# Patient Record
Sex: MALE | Race: WHITE | NOT HISPANIC OR LATINO
[De-identification: names, ages, dates, MRNs, and addresses within clinical notes are randomized per-mention and may not be internally consistent; named-entity substitution may affect disease eponyms.]

---

## 2017-01-03 ENCOUNTER — APPOINTMENT (OUTPATIENT)
Dept: INTERNAL MEDICINE | Facility: CLINIC | Age: 47
End: 2017-01-03

## 2017-01-23 ENCOUNTER — EMERGENCY (EMERGENCY)
Facility: HOSPITAL | Age: 47
LOS: 1 days | Discharge: HOME | End: 2017-01-23
Attending: EMERGENCY MEDICINE | Admitting: EMERGENCY MEDICINE
Payer: COMMERCIAL

## 2017-01-23 VITALS
OXYGEN SATURATION: 97 % | HEART RATE: 73 BPM | DIASTOLIC BLOOD PRESSURE: 79 MMHG | RESPIRATION RATE: 17 BRPM | TEMPERATURE: 98 F | SYSTOLIC BLOOD PRESSURE: 139 MMHG

## 2017-01-23 VITALS
HEART RATE: 85 BPM | OXYGEN SATURATION: 96 % | SYSTOLIC BLOOD PRESSURE: 145 MMHG | TEMPERATURE: 98 F | RESPIRATION RATE: 18 BRPM | DIASTOLIC BLOOD PRESSURE: 85 MMHG

## 2017-01-23 DIAGNOSIS — K08.499 PARTIAL LOSS OF TEETH DUE TO OTHER SPECIFIED CAUSE, UNSPECIFIED CLASS: Chronic | ICD-10-CM

## 2017-01-23 DIAGNOSIS — R10.30 LOWER ABDOMINAL PAIN, UNSPECIFIED: ICD-10-CM

## 2017-01-23 DIAGNOSIS — Z98.89 OTHER SPECIFIED POSTPROCEDURAL STATES: Chronic | ICD-10-CM

## 2017-01-23 LAB
ALBUMIN SERPL ELPH-MCNC: 4.3 G/DL — SIGNIFICANT CHANGE UP (ref 3.3–5)
ALP SERPL-CCNC: 71 U/L — SIGNIFICANT CHANGE UP (ref 40–120)
ALT FLD-CCNC: 29 U/L RC — SIGNIFICANT CHANGE UP (ref 10–45)
ANION GAP SERPL CALC-SCNC: 14 MMOL/L — SIGNIFICANT CHANGE UP (ref 5–17)
APPEARANCE UR: CLEAR — SIGNIFICANT CHANGE UP
AST SERPL-CCNC: 12 U/L — SIGNIFICANT CHANGE UP (ref 10–40)
BASOPHILS # BLD AUTO: 0 K/UL — SIGNIFICANT CHANGE UP (ref 0–0.2)
BASOPHILS NFR BLD AUTO: 0.5 % — SIGNIFICANT CHANGE UP (ref 0–2)
BILIRUB SERPL-MCNC: 0.1 MG/DL — LOW (ref 0.2–1.2)
BILIRUB UR-MCNC: NEGATIVE — SIGNIFICANT CHANGE UP
BUN SERPL-MCNC: 17 MG/DL — SIGNIFICANT CHANGE UP (ref 7–23)
CALCIUM SERPL-MCNC: 9.5 MG/DL — SIGNIFICANT CHANGE UP (ref 8.4–10.5)
CHLORIDE SERPL-SCNC: 99 MMOL/L — SIGNIFICANT CHANGE UP (ref 96–108)
CO2 SERPL-SCNC: 25 MMOL/L — SIGNIFICANT CHANGE UP (ref 22–31)
COLOR SPEC: YELLOW — SIGNIFICANT CHANGE UP
COMMENT - URINE: SIGNIFICANT CHANGE UP
CREAT SERPL-MCNC: 1.38 MG/DL — HIGH (ref 0.5–1.3)
DIFF PNL FLD: NEGATIVE — SIGNIFICANT CHANGE UP
EOSINOPHIL # BLD AUTO: 0.3 K/UL — SIGNIFICANT CHANGE UP (ref 0–0.5)
EOSINOPHIL NFR BLD AUTO: 3.5 % — SIGNIFICANT CHANGE UP (ref 0–6)
EPI CELLS # UR: SIGNIFICANT CHANGE UP /HPF
GLUCOSE SERPL-MCNC: 98 MG/DL — SIGNIFICANT CHANGE UP (ref 70–99)
GLUCOSE UR QL: NEGATIVE — SIGNIFICANT CHANGE UP
HCT VFR BLD CALC: 41.9 % — SIGNIFICANT CHANGE UP (ref 39–50)
HGB BLD-MCNC: 14.5 G/DL — SIGNIFICANT CHANGE UP (ref 13–17)
HIV 1 & 2 AB SERPL IA.RAPID: SIGNIFICANT CHANGE UP
KETONES UR-MCNC: NEGATIVE — SIGNIFICANT CHANGE UP
LEUKOCYTE ESTERASE UR-ACNC: NEGATIVE — SIGNIFICANT CHANGE UP
LYMPHOCYTES # BLD AUTO: 2 K/UL — SIGNIFICANT CHANGE UP (ref 1–3.3)
LYMPHOCYTES # BLD AUTO: 23.2 % — SIGNIFICANT CHANGE UP (ref 13–44)
MCHC RBC-ENTMCNC: 32.5 PG — SIGNIFICANT CHANGE UP (ref 27–34)
MCHC RBC-ENTMCNC: 34.7 GM/DL — SIGNIFICANT CHANGE UP (ref 32–36)
MCV RBC AUTO: 93.8 FL — SIGNIFICANT CHANGE UP (ref 80–100)
MONOCYTES # BLD AUTO: 0.8 K/UL — SIGNIFICANT CHANGE UP (ref 0–0.9)
MONOCYTES NFR BLD AUTO: 9.3 % — SIGNIFICANT CHANGE UP (ref 2–14)
NEUTROPHILS # BLD AUTO: 5.6 K/UL — SIGNIFICANT CHANGE UP (ref 1.8–7.4)
NEUTROPHILS NFR BLD AUTO: 63.5 % — SIGNIFICANT CHANGE UP (ref 43–77)
NITRITE UR-MCNC: NEGATIVE — SIGNIFICANT CHANGE UP
PH UR: 7 — SIGNIFICANT CHANGE UP (ref 4.8–8)
PLATELET # BLD AUTO: 317 K/UL — SIGNIFICANT CHANGE UP (ref 150–400)
POTASSIUM SERPL-MCNC: 4.3 MMOL/L — SIGNIFICANT CHANGE UP (ref 3.5–5.3)
POTASSIUM SERPL-SCNC: 4.3 MMOL/L — SIGNIFICANT CHANGE UP (ref 3.5–5.3)
PROT SERPL-MCNC: 7.1 G/DL — SIGNIFICANT CHANGE UP (ref 6–8.3)
PROT UR-MCNC: NEGATIVE — SIGNIFICANT CHANGE UP
RBC # BLD: 4.46 M/UL — SIGNIFICANT CHANGE UP (ref 4.2–5.8)
RBC # FLD: 13.1 % — SIGNIFICANT CHANGE UP (ref 10.3–14.5)
RBC CASTS # UR COMP ASSIST: SIGNIFICANT CHANGE UP /HPF (ref 0–2)
SODIUM SERPL-SCNC: 138 MMOL/L — SIGNIFICANT CHANGE UP (ref 135–145)
SP GR SPEC: 1.02 — SIGNIFICANT CHANGE UP (ref 1.01–1.02)
UROBILINOGEN FLD QL: NEGATIVE — SIGNIFICANT CHANGE UP
WBC # BLD: 8.8 K/UL — SIGNIFICANT CHANGE UP (ref 3.8–10.5)
WBC # FLD AUTO: 8.8 K/UL — SIGNIFICANT CHANGE UP (ref 3.8–10.5)

## 2017-01-23 PROCEDURE — 80053 COMPREHEN METABOLIC PANEL: CPT

## 2017-01-23 PROCEDURE — 86703 HIV-1/HIV-2 1 RESULT ANTBDY: CPT

## 2017-01-23 PROCEDURE — 87591 N.GONORRHOEAE DNA AMP PROB: CPT

## 2017-01-23 PROCEDURE — 99284 EMERGENCY DEPT VISIT MOD MDM: CPT | Mod: 25

## 2017-01-23 PROCEDURE — 99284 EMERGENCY DEPT VISIT MOD MDM: CPT

## 2017-01-23 PROCEDURE — 87086 URINE CULTURE/COLONY COUNT: CPT

## 2017-01-23 PROCEDURE — 87491 CHLMYD TRACH DNA AMP PROBE: CPT

## 2017-01-23 PROCEDURE — 81001 URINALYSIS AUTO W/SCOPE: CPT

## 2017-01-23 PROCEDURE — 85027 COMPLETE CBC AUTOMATED: CPT

## 2017-01-23 PROCEDURE — 86780 TREPONEMA PALLIDUM: CPT

## 2017-01-23 RX ORDER — SODIUM CHLORIDE 9 MG/ML
1000 INJECTION INTRAMUSCULAR; INTRAVENOUS; SUBCUTANEOUS ONCE
Qty: 0 | Refills: 0 | Status: COMPLETED | OUTPATIENT
Start: 2017-01-23 | End: 2017-01-23

## 2017-01-23 RX ORDER — CHLORHEXIDINE GLUCONATE 213 G/1000ML
1 SOLUTION TOPICAL
Qty: 1 | Refills: 0 | OUTPATIENT
Start: 2017-01-23 | End: 2017-01-30

## 2017-01-23 RX ORDER — MUPIROCIN 20 MG/G
1 OINTMENT TOPICAL
Qty: 1 | Refills: 0 | OUTPATIENT
Start: 2017-01-23 | End: 2017-01-28

## 2017-01-23 RX ADMIN — Medication 100 MILLIGRAM(S): at 17:33

## 2017-01-23 RX ADMIN — SODIUM CHLORIDE 1000 MILLILITER(S): 9 INJECTION INTRAMUSCULAR; INTRAVENOUS; SUBCUTANEOUS at 19:15

## 2017-01-23 NOTE — ED PROVIDER NOTE - OBJECTIVE STATEMENT
47y/o M PMH anxiety and staph infections c/o infection on his L scrotum x 2.5 weeks. pt states he had a similar occurrence 2 months ago. states his dog scratches them, then he gets a rash, then bump/swelling, and discharge. pt saw his PMD and was given amoxicillin which he completed. states he also noticed a pimple on his R scrotum now x 1 week. c/o rash and pimple on R nostril x 1 week also. denies any fever, chills, SOB, CP, palpitations.

## 2017-01-23 NOTE — ED ADULT NURSE NOTE - OBJECTIVE STATEMENT
c/o pain, irritation, redness and swelling to right groin area. States he shaves his pubic area and develops ingrown hairs and folliculitis frequently. Denies any fever or chills, c/o stuffy nose and feeling tired x 2 months. Patient states his girlfriend also has similar symptoms and she lives in the [place] when he recently visited.

## 2017-01-23 NOTE — ED PROVIDER NOTE - INGUINAL REGION
circumcised. testes x 2. no lesions of penis or scrotum. no swelling of scrotum/testes. no erythema/warmth/LEFT SIDE SWELLING/no tenderness

## 2017-01-23 NOTE — ED PROVIDER NOTE - MEDICAL DECISION MAKING DETAILS
RLQ and R nare cellulitis without obvious fluctuant pocket; likely MRSA given frequency of symptoms and association c breaks in the skin.  Well appearing, VSS, no evidence of more concerning pathology (fornier's, NSTI, etc).  Will d/c on doxy and Rx bactroban/hibiclens and refer to ID.  --BMM

## 2017-01-23 NOTE — ED PROVIDER NOTE - PROGRESS NOTE DETAILS
Attending Note (Juana): Patient signed out to me pending results. Labs reviewed - no neutropneia. mild elevation in creatinine. patient informed. making urine, k wnl.  advised outpatient follow up for this. abx sent.

## 2017-01-23 NOTE — ED PROVIDER NOTE - SKIN WOUND TYPE
+ erythematous macular papaular rash on R cheek. + induration of R suprapubic region (4cm), + ttp. + erythematous macular papaular rash on R cheek. + induration of RLQ along inguinal ligament  (2cm), + ttp. but no crepitus, fluctuance, or d/c

## 2017-01-23 NOTE — ED PROVIDER NOTE - CARE PLAN
Principal Discharge DX:	Abscess  Instructions for follow-up, activity and diet:	1. Take Doxycycline 100mg twice a day with food x 10 days. Apply bactroban ointment inside nostrils twice a day for 1 week. Use hibiclens wash daily for 1 week then as needed. Apply warm compress to affected area(s).  2. Follow up with your PMD within 1 week.  3. Any increased pain, redness, fever, chills return to ED. Principal Discharge DX:	Abscess  Instructions for follow-up, activity and diet:	1. Take Doxycycline 100mg twice a day with food x 10 days. Apply bactroban ointment inside nostrils twice a day for 1 week. Use hibiclens wash daily for 1 week then as needed. Apply warm compress to affected area(s).  2. Follow up with your PMD within 1 week. Your Creatinine was found to be high, discuss this with your PMD, bring a copy of your labs.  3. Any increased pain, redness, fever, chills return to ED. Principal Discharge DX:	Cellulitis of abdominal wall  Goal:	RLQ  Instructions for follow-up, activity and diet:	1. Take Doxycycline 100mg twice a day with food x 10 days. Apply bactroban ointment inside nostrils twice a day for 1 week. Use hibiclens wash daily for 1 week then as needed. Apply warm compress to affected area(s).  2. Follow up with your PMD within 1 week. Your Creatinine was found to be high, discuss this with your PMD, bring a copy of your labs.  3. Any increased pain, redness, fever, chills return to ED.

## 2017-01-23 NOTE — ED ADULT NURSE NOTE - ADDITIONAL PRINTED INSTRUCTIONS GIVEN
vitals stable denies c/o steady gait vitals stable denies c/o steady gait d/c instructions provided by PA

## 2017-01-23 NOTE — ED PROVIDER NOTE - PLAN OF CARE
1. Take Doxycycline 100mg twice a day with food x 10 days. Apply bactroban ointment inside nostrils twice a day for 1 week. Use hibiclens wash daily for 1 week then as needed. Apply warm compress to affected area(s).  2. Follow up with your PMD within 1 week.  3. Any increased pain, redness, fever, chills return to ED. 1. Take Doxycycline 100mg twice a day with food x 10 days. Apply bactroban ointment inside nostrils twice a day for 1 week. Use hibiclens wash daily for 1 week then as needed. Apply warm compress to affected area(s).  2. Follow up with your PMD within 1 week. Your Creatinine was found to be high, discuss this with your PMD, bring a copy of your labs.  3. Any increased pain, redness, fever, chills return to ED. RLQ

## 2017-01-24 LAB
CULTURE RESULTS: NO GROWTH — SIGNIFICANT CHANGE UP
SPECIMEN SOURCE: SIGNIFICANT CHANGE UP
T PALLIDUM AB TITR SER: NEGATIVE — SIGNIFICANT CHANGE UP

## 2017-01-25 LAB
C TRACH RRNA SPEC QL NAA+PROBE: SIGNIFICANT CHANGE UP
C TRACH RRNA SPEC QL NAA+PROBE: SIGNIFICANT CHANGE UP
GC AMPLIFICATION INTERPRETATION: SIGNIFICANT CHANGE UP
N GONORRHOEA RRNA SPEC QL NAA+PROBE: SIGNIFICANT CHANGE UP
SPECIMEN SOURCE: SIGNIFICANT CHANGE UP

## 2017-02-09 ENCOUNTER — APPOINTMENT (OUTPATIENT)
Dept: INTERNAL MEDICINE | Facility: CLINIC | Age: 47
End: 2017-02-09

## 2017-02-14 ENCOUNTER — APPOINTMENT (OUTPATIENT)
Dept: INTERNAL MEDICINE | Facility: CLINIC | Age: 47
End: 2017-02-14

## 2017-02-16 ENCOUNTER — APPOINTMENT (OUTPATIENT)
Dept: INTERNAL MEDICINE | Facility: CLINIC | Age: 47
End: 2017-02-16

## 2017-02-16 VITALS
OXYGEN SATURATION: 96 % | HEART RATE: 78 BPM | DIASTOLIC BLOOD PRESSURE: 80 MMHG | WEIGHT: 210 LBS | HEIGHT: 71 IN | TEMPERATURE: 97.5 F | SYSTOLIC BLOOD PRESSURE: 130 MMHG | RESPIRATION RATE: 12 BRPM | BODY MASS INDEX: 29.4 KG/M2

## 2017-02-16 DIAGNOSIS — L72.9 FOLLICULAR CYST OF THE SKIN AND SUBCUTANEOUS TISSUE, UNSPECIFIED: ICD-10-CM

## 2017-04-25 ENCOUNTER — EMERGENCY (EMERGENCY)
Facility: HOSPITAL | Age: 47
LOS: 1 days | Discharge: HOME | End: 2017-04-25
Attending: EMERGENCY MEDICINE | Admitting: EMERGENCY MEDICINE
Payer: SELF-PAY

## 2017-04-25 VITALS
HEART RATE: 90 BPM | TEMPERATURE: 98 F | DIASTOLIC BLOOD PRESSURE: 86 MMHG | OXYGEN SATURATION: 96 % | RESPIRATION RATE: 17 BRPM | SYSTOLIC BLOOD PRESSURE: 125 MMHG

## 2017-04-25 VITALS
TEMPERATURE: 98 F | RESPIRATION RATE: 18 BRPM | DIASTOLIC BLOOD PRESSURE: 84 MMHG | SYSTOLIC BLOOD PRESSURE: 124 MMHG | HEART RATE: 76 BPM | OXYGEN SATURATION: 97 %

## 2017-04-25 DIAGNOSIS — K08.499 PARTIAL LOSS OF TEETH DUE TO OTHER SPECIFIED CAUSE, UNSPECIFIED CLASS: Chronic | ICD-10-CM

## 2017-04-25 DIAGNOSIS — Z98.89 OTHER SPECIFIED POSTPROCEDURAL STATES: Chronic | ICD-10-CM

## 2017-04-25 DIAGNOSIS — L03.211 CELLULITIS OF FACE: ICD-10-CM

## 2017-04-25 DIAGNOSIS — L53.9 ERYTHEMATOUS CONDITION, UNSPECIFIED: ICD-10-CM

## 2017-04-25 PROCEDURE — 99283 EMERGENCY DEPT VISIT LOW MDM: CPT

## 2017-04-25 RX ORDER — AZTREONAM 2 G
1 VIAL (EA) INJECTION
Qty: 14 | Refills: 0 | OUTPATIENT
Start: 2017-04-25 | End: 2017-05-02

## 2017-04-25 RX ADMIN — Medication 1 TABLET(S): at 21:29

## 2017-04-25 NOTE — ED ADULT NURSE NOTE - OBJECTIVE STATEMENT
Pt is a 47 yo male with the redness and swelling that started in Sunday and has been getting increasingly worse over the past few days. Pt states that he feels Pt is a 47 yo male with the redness and swelling that started in Sunday and has been getting increasingly worse over the past few days. Pt states that he feels that he may have been bit by a spider while he was sleeping, he found/killed two spiders in his room when he woke up. Pt has a pmh of MRSA, Anxiety and depression. He denies any CP or SOB no N/V/D no cough fever or chills.

## 2017-04-25 NOTE — ED ADULT NURSE NOTE - CHIEF COMPLAINT QUOTE
Patient c/o of redness and swelling on the tip of his nose, "possible  a bug bite", history of MRSA in the past .

## 2017-04-25 NOTE — ED PROVIDER NOTE - OBJECTIVE STATEMENT
47yo male PMH anxiety, depression, MRSA infection in past presenting with nose redness and swelling since Sunday, took amoxicillin for 2 days but feels pain is getting worse, now feels it in his left cheek and feels that left eye is becoming swollen. No fevers or chills, no chest pain or shortness of breath, no difficulty swallowing.

## 2017-04-25 NOTE — ED PROVIDER NOTE - ATTENDING CONTRIBUTION TO CARE
Attending MD Bergman: I personally have seen and examined this patient.  Resident note reviewed and agree on plan of care and except where noted.  See below for details.    46M with PMH anxiety, depression and previous MRSA infections presents to the ED with nose redness and pain since Sunday.  Reports that he uses nasal hair cutters twice weekly.  Reports he took Amoxicillin that someone gave him (not MD) for two days with symptoms continuing to worsen.  Reports that now feels pain is extending to L cheek and up towards L eye.  Denies fevers, chills, diplopia, blurry vision, pain with EOMI, epistaxis, sore throat.  On exam, NAD, NCAT, PERRL, EOMI, +erythema and tenderness to tip of nose, no nasal septal hematoma, +erythema to distal septal mucosal to L nares, no fluctuance, no dried blood in nares; Plan: ENT, suspect cellulitis r/o abscess

## 2017-04-25 NOTE — ED PROVIDER NOTE - MEDICAL DECISION MAKING DETAILS
45yo male PMH anxiety, multiple abscesses in past, presenting with nose erythema, likely cellulitis. ENT consulted, pt will likely get antibiotics, DC home vs CDU. Latonya Falk DO

## 2017-04-25 NOTE — ED PROVIDER NOTE - PROGRESS NOTE DETAILS
Attending MD Bergman: Seen by ENT, agree cellulitis.  Rx Bactrim DS BID x 1 week.  Patient stable for discharge. Follow up instructions given, importance of follow up emphasized, return to ED parameters reviewed and patient verbalized understanding.  All questions answered, all concerns addressed.

## 2017-04-25 NOTE — ED ADULT TRIAGE NOTE - CHIEF COMPLAINT QUOTE
Patient c/o of redness and swelling on the tip of his nose, "possible  a bug bite", history of MRSA in the past. Patient c/o of redness and swelling on the tip of his nose, "possible  a bug bite", history of MRSA in the past .

## 2017-04-25 NOTE — ED PROVIDER NOTE - PHYSICAL EXAMINATION
HEENT: Tip of nose erythematous, with erythema on nasal septum, no abscess. PERRL, EOMI without pain or entrapment

## 2017-05-01 ENCOUNTER — EMERGENCY (EMERGENCY)
Facility: HOSPITAL | Age: 47
LOS: 1 days | Discharge: HOME | End: 2017-05-01
Attending: EMERGENCY MEDICINE | Admitting: EMERGENCY MEDICINE
Payer: SELF-PAY

## 2017-05-01 VITALS
OXYGEN SATURATION: 96 % | RESPIRATION RATE: 85 BRPM | SYSTOLIC BLOOD PRESSURE: 132 MMHG | HEART RATE: 86 BPM | TEMPERATURE: 98 F | DIASTOLIC BLOOD PRESSURE: 82 MMHG

## 2017-05-01 DIAGNOSIS — L03.211 CELLULITIS OF FACE: ICD-10-CM

## 2017-05-01 DIAGNOSIS — Z98.89 OTHER SPECIFIED POSTPROCEDURAL STATES: Chronic | ICD-10-CM

## 2017-05-01 DIAGNOSIS — K08.499 PARTIAL LOSS OF TEETH DUE TO OTHER SPECIFIED CAUSE, UNSPECIFIED CLASS: Chronic | ICD-10-CM

## 2017-05-01 PROCEDURE — 99283 EMERGENCY DEPT VISIT LOW MDM: CPT

## 2017-05-01 NOTE — ED PROVIDER NOTE - OBJECTIVE STATEMENT
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 Patient was here on April 25th for nose, left cheek and L upper lip swelling, given Bactrim x7 days, cellulitis improved dramatically but he continues to have drainage of purulent material and swelling of his nose. No F/C.

## 2017-05-01 NOTE — ED PROVIDER NOTE - CARE PLAN
Principal Discharge DX:	Cellulitis of nose  Instructions for follow-up, activity and diet:	You were seen in the ER for nose cellulitis. You must follow up with ENT in 24 to 48 hours. The number for the otolaryngology (ear, nose, throat) clinic is [phone]). You must follow up with your primary physician in 24 to 48 hours. Return to the ER for any new or worsening signs/symptoms.   1) Take clindamycin as prescribed Principal Discharge DX:	Cellulitis of nose  Instructions for follow-up, activity and diet:	You were seen in the ER for nose cellulitis. You must follow up with ENT in 24 to 48 hours. The number for the otolaryngology (ear, nose, throat) clinic is [phone]). You must follow up with your primary physician in 24 to 48 hours. Return to the ER for any new or worsening signs/symptoms.   1) Take clindamycin as prescribed Principal Discharge DX:	Cellulitis of nose  Instructions for follow-up, activity and diet:	You were seen in the ER for nose cellulitis. You must follow up with ENT in 24 to 48 hours. The number for the otolaryngology (ear, nose, throat) clinic is [phone]). You must follow up with your primary physician in 24 to 48 hours. Return to the ER for any new or worsening signs/symptoms.   1) Take clindamycin as prescribed

## 2017-05-01 NOTE — ED PROVIDER NOTE - NS ED ROS FT
No fever or chills, no change in vision, no trouble swallowing or speaking, no chest pain, no cough or SOB, no abdominal pain, no nausea or no vomiting, no joint pain, no rashes, no headache,  otherwise as HPI or negative

## 2017-05-01 NOTE — ED PROVIDER NOTE - ATTENDING CONTRIBUTION TO CARE
46yoM returns with improved facial cellulitis but persistent swelling, redness, and intermittent nasal drainage.   On exam, L nare with swelling, redness ?fluctuance.  CEllulitis with possible nasal abscess - pt wishes to fu with ENT outpt.   Will incise at bedside, change abx to doxy, fu ENT.

## 2017-05-01 NOTE — ED PROVIDER NOTE - PLAN OF CARE
You were seen in the ER for nose cellulitis. You must follow up with ENT in 24 to 48 hours. The number for the otolaryngology (ear, nose, throat) clinic is [phone]). You must follow up with your primary physician in 24 to 48 hours. Return to the ER for any new or worsening signs/symptoms.   1) Take clindamycin as prescribed

## 2017-05-01 NOTE — ED PROVIDER NOTE - PHYSICAL EXAMINATION
GENERAL: Awake, alert in NAD // HEENT: MMM // HEAD: Symmetric and atraumatic // EYES: EOMI // CARDIO: Skin warm and well perfused // PULM: normal respiratory effort // NEURO: Motor, sensory grossly intact // SKIN: Warm, dry, good turgur, redness, swelling of nose with small (2mm) ulceration draining purulent material from L nare.  // PSYCH: Mood and affect appropriate.

## 2017-05-01 NOTE — ED PROVIDER NOTE - MEDICAL DECISION MAKING DETAILS
Patient with cellulitis of nose, doubt abscess given not fluctuant. Patient did not follow up with ENT as recommended at last visit. Will change antibiotics to clindamycin and discharge home with close ENT follow up. Patient with cellulitis of nose, doubt abscess given not fluctuant. Patient did not follow up with ENT as recommended at last visit. Will change antibiotics to clindamycin and discharge home with close ENT follow up.  Humberto Lin DO; see attending attestation

## 2017-05-01 NOTE — ED ADULT NURSE NOTE - OBJECTIVE STATEMENT
pt states, "was seen in ED on 4/25/17 for left sided facial swelling and was d/c with antibiotics and told to follow up with ENT. I have completed the course of abx and the swelling on the left side of my nose has not completely gone away." pt denies difficulty breathing/SOB, fevers, chills.

## 2017-05-08 ENCOUNTER — APPOINTMENT (OUTPATIENT)
Dept: PAIN MANAGEMENT | Facility: CLINIC | Age: 47
End: 2017-05-08

## 2017-05-08 VITALS
HEIGHT: 71 IN | WEIGHT: 213 LBS | BODY MASS INDEX: 29.82 KG/M2 | DIASTOLIC BLOOD PRESSURE: 85 MMHG | SYSTOLIC BLOOD PRESSURE: 120 MMHG | HEART RATE: 83 BPM

## 2017-05-08 RX ORDER — QUETIAPINE 100 MG/1
100 TABLET, FILM COATED ORAL DAILY
Refills: 0 | Status: ACTIVE | COMMUNITY

## 2017-05-08 RX ORDER — DIVALPROEX SODIUM 500 MG/1
500 TABLET, DELAYED RELEASE ORAL
Refills: 0 | Status: ACTIVE | COMMUNITY

## 2017-06-08 ENCOUNTER — MEDICATION RENEWAL (OUTPATIENT)
Age: 47
End: 2017-06-08

## 2017-06-08 ENCOUNTER — MESSAGE (OUTPATIENT)
Age: 47
End: 2017-06-08

## 2017-06-14 ENCOUNTER — APPOINTMENT (OUTPATIENT)
Dept: PAIN MANAGEMENT | Facility: CLINIC | Age: 47
End: 2017-06-14

## 2017-06-19 ENCOUNTER — APPOINTMENT (OUTPATIENT)
Dept: PAIN MANAGEMENT | Facility: CLINIC | Age: 47
End: 2017-06-19

## 2017-06-19 ENCOUNTER — EMERGENCY (EMERGENCY)
Facility: HOSPITAL | Age: 47
LOS: 1 days | Discharge: HOME | End: 2017-06-19
Attending: EMERGENCY MEDICINE | Admitting: EMERGENCY MEDICINE
Payer: COMMERCIAL

## 2017-06-19 VITALS
OXYGEN SATURATION: 97 % | RESPIRATION RATE: 16 BRPM | HEART RATE: 81 BPM | SYSTOLIC BLOOD PRESSURE: 143 MMHG | TEMPERATURE: 98 F | DIASTOLIC BLOOD PRESSURE: 88 MMHG

## 2017-06-19 DIAGNOSIS — Z98.89 OTHER SPECIFIED POSTPROCEDURAL STATES: Chronic | ICD-10-CM

## 2017-06-19 DIAGNOSIS — K08.499 PARTIAL LOSS OF TEETH DUE TO OTHER SPECIFIED CAUSE, UNSPECIFIED CLASS: Chronic | ICD-10-CM

## 2017-06-19 PROCEDURE — 99283 EMERGENCY DEPT VISIT LOW MDM: CPT

## 2017-06-19 RX ORDER — MUPIROCIN 20 MG/G
1 OINTMENT TOPICAL
Qty: 1 | Refills: 0 | OUTPATIENT
Start: 2017-06-19 | End: 2017-06-26

## 2017-06-19 RX ADMIN — Medication 100 MILLIGRAM(S): at 23:59

## 2017-06-19 NOTE — ED PROVIDER NOTE - PLAN OF CARE
1) Please follow-up with your Primary Medical Doctor in 3-5 days. If you need to find a new physician, please call [phone].  2) Return to the Emergency Department if you experiences: fevers, chills, vomiting, headache, worsening pain, or symptoms that are new or recurrent.  3) If you have any questions or concerns, do not hesitate to contact us at [phone].  4) Take the antibiotics as prescribed.  5) It is very important to follow-up with the ENT, call them at [phone].

## 2017-06-19 NOTE — ED PROVIDER NOTE - MEDICAL DECISION MAKING DETAILS
abscess of L nostril and L ear, w/o signs of mastoiditis (no mastoid erythema or TTP), and no signs of sepsis. Pt w/ stable vitals, well-appearing. Will trial PO atbx as outpatient. L nostril abscess is draining so no indication for I&D. abscess of L nostril and L ear, w/o signs of mastoiditis (no mastoid erythema or TTP), and no signs of sepsis. Pt w/ stable vitals, well-appearing. Will trial PO atbx as outpatient. L nostril abscess is draining so no indication for I&D.  Humberto Lin DO; see attending attestation

## 2017-06-19 NOTE — ED ADULT NURSE NOTE - OBJECTIVE STATEMENT
46yr male presented to ED c/o cysts in R & L nostrils.  Pt was in ED 2 weeks prior for L side cyst in nostril, MD lanced cyst was treated with antibiotics and sent home with medication and told to follow up with ENT. Pt reports he did not and now cyst has returned on L side of nostril, reports pain to L jaw and ear, and has cyst on R side of nostril as well, Pt reports some pain to L ear and jaw, Pt has not taken anything for the pain, skin warm dry & intact, a&ox4.

## 2017-06-19 NOTE — ED PROVIDER NOTE - CARE PLAN
Principal Discharge DX:	Abscess of face  Instructions for follow-up, activity and diet:	1) Please follow-up with your Primary Medical Doctor in 3-5 days. If you need to find a new physician, please call [phone].  2) Return to the Emergency Department if you experiences: fevers, chills, vomiting, headache, worsening pain, or symptoms that are new or recurrent.  3) If you have any questions or concerns, do not hesitate to contact us at [phone].  4) Take the antibiotics as prescribed.  5) It is very important to follow-up with the ENT, call them at [phone]. Principal Discharge DX:	Abscess of face  Instructions for follow-up, activity and diet:	1) Please follow-up with your Primary Medical Doctor in 3-5 days. If you need to find a new physician, please call [phone].  2) Return to the Emergency Department if you experiences: fevers, chills, vomiting, headache, worsening pain, or symptoms that are new or recurrent.  3) If you have any questions or concerns, do not hesitate to contact us at [phone].  4) Take the antibiotics as prescribed.  5) It is very important to follow-up with the ENT, call them at [phone].

## 2017-06-19 NOTE — ED ADULT TRIAGE NOTE - CHIEF COMPLAINT QUOTE
sore throat, left sided ear pain and cyst in  right nostril x 2 weeks   facial operation 20 months ago (from dental infection) with +MRSA and infection has been recurrent since then

## 2017-06-19 NOTE — ED PROVIDER NOTE - PHYSICAL EXAMINATION
Physical Exam: NAD, AAOx3, NCAT, MMM, L external auditory canal w/ erythema proximal to the TM w/ clear TM bilaterally, L nostril w/ abscess w/ yellow drainage, neck is supple, PERRL, CTAB, normal rate and regular rhythm, abdomen is soft and NTND, No edema, No deformity of extremities, No rashes, CN grossly intact, No focal motor or sensory deficits. ~ Juni Ayala MD

## 2017-06-19 NOTE — ED PROVIDER NOTE - ATTENDING CONTRIBUTION TO CARE
pt known to me presents with recurrent nostril abscess. did not follow up with ENT as instructed previously. Says he will this time.   On exam, mild deuce L nostril with mild erythema.   A: nostril abscess.   P: will rx abx again to cover MRSA. pt states will fu ENT this time.

## 2017-06-19 NOTE — ED PROVIDER NOTE - NS ED ROS FT
+ resolved fever, chills, no change in vision, no change in hearing, no chest pain, no shortness of breath, no abdominal pain, no vomiting, no dysuria, no muscle pain, + abscesses, no loss of consciousness. ~ Juni Ayala MD

## 2017-06-19 NOTE — ED ADULT NURSE NOTE - FAMILY HISTORY
Mother  Still living? Unknown  Family history of lung cancer, Age at diagnosis: Age Unknown     Father  Still living? Unknown  Family history of diabetes mellitus, Age at diagnosis: Age Unknown

## 2017-06-19 NOTE — ED PROVIDER NOTE - OBJECTIVE STATEMENT
Presents with L nostril cyst and L ear pain.   Hx of recurrent MRSA abscesses, has been on Bactrim and Doxy PO. Also took Amoxicillin w/ continued symptoms.

## 2017-06-20 VITALS
RESPIRATION RATE: 17 BRPM | SYSTOLIC BLOOD PRESSURE: 140 MMHG | HEART RATE: 72 BPM | DIASTOLIC BLOOD PRESSURE: 87 MMHG | OXYGEN SATURATION: 96 %

## 2017-06-26 ENCOUNTER — APPOINTMENT (OUTPATIENT)
Dept: PAIN MANAGEMENT | Facility: CLINIC | Age: 47
End: 2017-06-26

## 2017-06-26 VITALS
WEIGHT: 224 LBS | SYSTOLIC BLOOD PRESSURE: 118 MMHG | BODY MASS INDEX: 31.36 KG/M2 | DIASTOLIC BLOOD PRESSURE: 78 MMHG | HEIGHT: 71 IN

## 2017-06-26 RX ORDER — BACLOFEN 10 MG/1
10 TABLET ORAL
Qty: 60 | Refills: 1 | Status: DISCONTINUED | COMMUNITY
Start: 2017-05-08 | End: 2017-06-26

## 2017-07-11 ENCOUNTER — APPOINTMENT (OUTPATIENT)
Dept: INTERNAL MEDICINE | Facility: CLINIC | Age: 47
End: 2017-07-11

## 2017-07-11 ENCOUNTER — MEDICATION RENEWAL (OUTPATIENT)
Age: 47
End: 2017-07-11

## 2017-08-01 ENCOUNTER — APPOINTMENT (OUTPATIENT)
Dept: PAIN MANAGEMENT | Facility: CLINIC | Age: 47
End: 2017-08-01

## 2017-08-23 ENCOUNTER — MEDICATION RENEWAL (OUTPATIENT)
Age: 47
End: 2017-08-23

## 2017-08-30 ENCOUNTER — APPOINTMENT (OUTPATIENT)
Dept: PAIN MANAGEMENT | Facility: CLINIC | Age: 47
End: 2017-08-30

## 2017-10-06 ENCOUNTER — APPOINTMENT (OUTPATIENT)
Dept: PAIN MANAGEMENT | Facility: CLINIC | Age: 47
End: 2017-10-06
Payer: COMMERCIAL

## 2017-10-06 VITALS
WEIGHT: 227 LBS | DIASTOLIC BLOOD PRESSURE: 86 MMHG | BODY MASS INDEX: 31.78 KG/M2 | SYSTOLIC BLOOD PRESSURE: 129 MMHG | HEART RATE: 75 BPM | HEIGHT: 71 IN

## 2017-10-06 DIAGNOSIS — R69 ILLNESS, UNSPECIFIED: ICD-10-CM

## 2017-10-06 PROCEDURE — 99213 OFFICE O/P EST LOW 20 MIN: CPT

## 2017-10-06 RX ORDER — SULFAMETHOXAZOLE AND TRIMETHOPRIM 800; 160 MG/1; MG/1
800-160 TABLET ORAL
Qty: 14 | Refills: 0 | Status: DISCONTINUED | COMMUNITY
Start: 2017-04-26

## 2017-10-06 RX ORDER — DIVALPROEX SODIUM 250 MG/1
250 TABLET, DELAYED RELEASE ORAL
Qty: 60 | Refills: 0 | Status: DISCONTINUED | COMMUNITY
Start: 2017-03-30

## 2017-10-06 RX ORDER — CYCLOBENZAPRINE HYDROCHLORIDE 10 MG/1
10 TABLET, FILM COATED ORAL 3 TIMES DAILY
Qty: 90 | Refills: 1 | Status: DISCONTINUED | COMMUNITY
Start: 2017-06-26 | End: 2017-10-06

## 2017-10-06 RX ORDER — DOXYCYCLINE HYCLATE 100 MG/1
100 CAPSULE ORAL
Qty: 14 | Refills: 0 | Status: DISCONTINUED | COMMUNITY
Start: 2017-05-02

## 2017-10-06 RX ORDER — MUPIROCIN 20 MG/G
2 OINTMENT TOPICAL
Qty: 22 | Refills: 0 | Status: DISCONTINUED | COMMUNITY
Start: 2017-07-10

## 2017-10-06 RX ORDER — DOXYCYCLINE HYCLATE 100 MG/1
100 TABLET, DELAYED RELEASE ORAL
Qty: 20 | Refills: 0 | Status: DISCONTINUED | COMMUNITY
Start: 2017-06-20

## 2017-10-06 RX ORDER — GABAPENTIN 100 MG/1
100 CAPSULE ORAL 3 TIMES DAILY
Qty: 90 | Refills: 1 | Status: DISCONTINUED | COMMUNITY
Start: 2017-07-11 | End: 2017-10-06

## 2017-11-14 ENCOUNTER — APPOINTMENT (OUTPATIENT)
Dept: INTERNAL MEDICINE | Facility: CLINIC | Age: 47
End: 2017-11-14

## 2018-01-12 ENCOUNTER — APPOINTMENT (OUTPATIENT)
Dept: PAIN MANAGEMENT | Facility: CLINIC | Age: 48
End: 2018-01-12
Payer: COMMERCIAL

## 2018-01-12 VITALS
HEART RATE: 75 BPM | WEIGHT: 229 LBS | HEIGHT: 71 IN | DIASTOLIC BLOOD PRESSURE: 79 MMHG | SYSTOLIC BLOOD PRESSURE: 128 MMHG | BODY MASS INDEX: 32.06 KG/M2

## 2018-01-12 PROCEDURE — 99213 OFFICE O/P EST LOW 20 MIN: CPT

## 2018-02-06 ENCOUNTER — MEDICATION RENEWAL (OUTPATIENT)
Age: 48
End: 2018-02-06

## 2018-02-08 ENCOUNTER — MEDICATION RENEWAL (OUTPATIENT)
Age: 48
End: 2018-02-08

## 2018-02-12 ENCOUNTER — RX RENEWAL (OUTPATIENT)
Age: 48
End: 2018-02-12

## 2018-03-12 ENCOUNTER — RX RENEWAL (OUTPATIENT)
Age: 48
End: 2018-03-12

## 2018-03-22 ENCOUNTER — APPOINTMENT (OUTPATIENT)
Dept: INTERNAL MEDICINE | Facility: CLINIC | Age: 48
End: 2018-03-22
Payer: COMMERCIAL

## 2018-03-22 VITALS
WEIGHT: 226 LBS | HEIGHT: 71 IN | DIASTOLIC BLOOD PRESSURE: 83 MMHG | SYSTOLIC BLOOD PRESSURE: 136 MMHG | TEMPERATURE: 98.3 F | HEART RATE: 82 BPM | RESPIRATION RATE: 12 BRPM | OXYGEN SATURATION: 95 % | BODY MASS INDEX: 31.64 KG/M2

## 2018-03-22 DIAGNOSIS — L02.01 CUTANEOUS ABSCESS OF FACE: ICD-10-CM

## 2018-03-22 DIAGNOSIS — L03.211 CELLULITIS OF FACE: ICD-10-CM

## 2018-03-22 DIAGNOSIS — Z00.00 ENCOUNTER FOR GENERAL ADULT MEDICAL EXAMINATION W/OUT ABNORMAL FINDINGS: ICD-10-CM

## 2018-03-22 PROCEDURE — 99214 OFFICE O/P EST MOD 30 MIN: CPT

## 2018-03-22 RX ORDER — AMOXICILLIN AND CLAVULANATE POTASSIUM 875; 125 MG/1; MG/1
875-125 TABLET, COATED ORAL
Qty: 14 | Refills: 0 | Status: ACTIVE | COMMUNITY
Start: 2018-03-22 | End: 1900-01-01

## 2018-03-23 DIAGNOSIS — E78.5 HYPERLIPIDEMIA, UNSPECIFIED: ICD-10-CM

## 2018-03-23 LAB
25(OH)D3 SERPL-MCNC: 11.5 NG/ML
ALBUMIN SERPL ELPH-MCNC: 4.7 G/DL
ALP BLD-CCNC: 59 U/L
ALT SERPL-CCNC: 41 U/L
ANION GAP SERPL CALC-SCNC: 15 MMOL/L
AST SERPL-CCNC: 36 U/L
BASOPHILS # BLD AUTO: 0.03 K/UL
BASOPHILS NFR BLD AUTO: 0.4 %
BILIRUB SERPL-MCNC: 0.2 MG/DL
BUN SERPL-MCNC: 16 MG/DL
CALCIUM SERPL-MCNC: 9.6 MG/DL
CHLORIDE SERPL-SCNC: 100 MMOL/L
CHOLEST SERPL-MCNC: 261 MG/DL
CHOLEST/HDLC SERPL: 8.4 RATIO
CO2 SERPL-SCNC: 23 MMOL/L
CREAT SERPL-MCNC: 0.96 MG/DL
EOSINOPHIL # BLD AUTO: 0.27 K/UL
EOSINOPHIL NFR BLD AUTO: 3.9 %
GLUCOSE SERPL-MCNC: 100 MG/DL
HCT VFR BLD CALC: 42.1 %
HDLC SERPL-MCNC: 31 MG/DL
HGB BLD-MCNC: 14.9 G/DL
IMM GRANULOCYTES NFR BLD AUTO: 0.1 %
LDLC SERPL CALC-MCNC: NORMAL
LYMPHOCYTES # BLD AUTO: 1.63 K/UL
LYMPHOCYTES NFR BLD AUTO: 23.4 %
MAN DIFF?: NORMAL
MCHC RBC-ENTMCNC: 32.2 PG
MCHC RBC-ENTMCNC: 35.4 GM/DL
MCV RBC AUTO: 90.9 FL
MONOCYTES # BLD AUTO: 0.71 K/UL
MONOCYTES NFR BLD AUTO: 10.2 %
NEUTROPHILS # BLD AUTO: 4.33 K/UL
NEUTROPHILS NFR BLD AUTO: 62 %
PLATELET # BLD AUTO: 267 K/UL
POTASSIUM SERPL-SCNC: 4 MMOL/L
PROT SERPL-MCNC: 7.8 G/DL
RBC # BLD: 4.63 M/UL
RBC # FLD: 13.9 %
SODIUM SERPL-SCNC: 138 MMOL/L
TRIGL SERPL-MCNC: 842 MG/DL
TSH SERPL-ACNC: 1.56 UIU/ML
WBC # FLD AUTO: 6.98 K/UL

## 2018-04-02 RX ORDER — OMEGA-3-ACID ETHYL ESTERS CAPSULES 1 G/1
1 CAPSULE, LIQUID FILLED ORAL TWICE DAILY
Qty: 120 | Refills: 0 | Status: ACTIVE | COMMUNITY
Start: 2018-03-23 | End: 1900-01-01

## 2018-04-30 ENCOUNTER — RX RENEWAL (OUTPATIENT)
Age: 48
End: 2018-04-30

## 2018-05-04 ENCOUNTER — APPOINTMENT (OUTPATIENT)
Dept: PAIN MANAGEMENT | Facility: CLINIC | Age: 48
End: 2018-05-04

## 2018-05-11 ENCOUNTER — APPOINTMENT (OUTPATIENT)
Dept: PAIN MANAGEMENT | Facility: CLINIC | Age: 48
End: 2018-05-11
Payer: COMMERCIAL

## 2018-05-11 VITALS
HEIGHT: 71 IN | WEIGHT: 226 LBS | DIASTOLIC BLOOD PRESSURE: 88 MMHG | HEART RATE: 86 BPM | BODY MASS INDEX: 31.64 KG/M2 | SYSTOLIC BLOOD PRESSURE: 145 MMHG

## 2018-05-11 DIAGNOSIS — R51 HEADACHE: ICD-10-CM

## 2018-05-11 PROCEDURE — 99213 OFFICE O/P EST LOW 20 MIN: CPT

## 2018-05-18 ENCOUNTER — MEDICATION RENEWAL (OUTPATIENT)
Age: 48
End: 2018-05-18

## 2018-05-18 ENCOUNTER — APPOINTMENT (OUTPATIENT)
Dept: PAIN MANAGEMENT | Facility: CLINIC | Age: 48
End: 2018-05-18

## 2018-05-18 RX ORDER — CARBAMAZEPINE 100 MG/1
100 TABLET, CHEWABLE ORAL
Qty: 270 | Refills: 2 | Status: ACTIVE | COMMUNITY
Start: 2018-02-06 | End: 1900-01-01

## 2018-05-22 ENCOUNTER — RX RENEWAL (OUTPATIENT)
Age: 48
End: 2018-05-22

## 2018-05-29 ENCOUNTER — APPOINTMENT (OUTPATIENT)
Dept: INTERNAL MEDICINE | Facility: CLINIC | Age: 48
End: 2018-05-29
Payer: COMMERCIAL

## 2018-05-29 VITALS
BODY MASS INDEX: 31.5 KG/M2 | DIASTOLIC BLOOD PRESSURE: 93 MMHG | HEIGHT: 71 IN | HEART RATE: 72 BPM | RESPIRATION RATE: 12 BRPM | SYSTOLIC BLOOD PRESSURE: 143 MMHG | TEMPERATURE: 98.7 F | WEIGHT: 225 LBS | OXYGEN SATURATION: 95 %

## 2018-05-29 DIAGNOSIS — H00.015 HORDEOLUM EXTERNUM LEFT LOWER EYELID: ICD-10-CM

## 2018-05-29 PROCEDURE — 99213 OFFICE O/P EST LOW 20 MIN: CPT

## 2018-05-29 RX ORDER — ERYTHROMYCIN 5 MG/G
5 OINTMENT OPHTHALMIC 4 TIMES DAILY
Qty: 1 | Refills: 0 | Status: ACTIVE | COMMUNITY
Start: 2018-05-29 | End: 1900-01-01

## 2018-05-29 NOTE — PHYSICAL EXAM
[Conjunctiva] : the conjunctiva were normal in both eyes [Blepharitis Both Eyes] : no blepharitis of the lower eyelids [___] : a [unfilled] ~Umm stye was noted on the lower eyelid [Eyelid Swelling Left Lower Lid] : swelling of the lower eyelid was noted  [Lacrimal Gland Purulent Discharge Right Eye] : without purulent discharge [Lacrimal Gland Purulent Discharge Left Eye] : without purulent discharge [PERRL] : pupils were equal in size, round, and reactive to light [EOM Intact] : extraocular movements were intact [Normal] : the fundoscopic exam was normal in both eyes

## 2018-05-29 NOTE — ASSESSMENT
[FreeTextEntry1] : ACUTE VISIT OF 47 Y OLD MALE WHO PRESENTS WITH LEFT LOWER EYELID HORDEOLUM = ERYTHROMYCIN OINT RX,REFER TO SEE OPHTHALMOLOGY;CONTINUE WARM COMPRESSES

## 2018-06-25 ENCOUNTER — RX RENEWAL (OUTPATIENT)
Age: 48
End: 2018-06-25

## 2018-06-29 ENCOUNTER — RX RENEWAL (OUTPATIENT)
Age: 48
End: 2018-06-29

## 2018-06-29 RX ORDER — CHOLECALCIFEROL (VITAMIN D3) 1250 MCG
1.25 MG CAPSULE ORAL
Qty: 6 | Refills: 1 | Status: ACTIVE | COMMUNITY
Start: 2018-03-23 | End: 1900-01-01

## 2018-08-09 ENCOUNTER — RX RENEWAL (OUTPATIENT)
Age: 48
End: 2018-08-09

## 2018-08-16 ENCOUNTER — APPOINTMENT (OUTPATIENT)
Dept: INTERNAL MEDICINE | Facility: CLINIC | Age: 48
End: 2018-08-16
Payer: COMMERCIAL

## 2018-08-16 VITALS
DIASTOLIC BLOOD PRESSURE: 82 MMHG | WEIGHT: 233 LBS | HEART RATE: 83 BPM | TEMPERATURE: 98.4 F | SYSTOLIC BLOOD PRESSURE: 133 MMHG | RESPIRATION RATE: 12 BRPM | HEIGHT: 71 IN | BODY MASS INDEX: 32.62 KG/M2 | OXYGEN SATURATION: 95 %

## 2018-08-16 DIAGNOSIS — Z87.09 PERSONAL HISTORY OF OTHER DISEASES OF THE RESPIRATORY SYSTEM: ICD-10-CM

## 2018-08-16 PROCEDURE — 99214 OFFICE O/P EST MOD 30 MIN: CPT

## 2018-08-16 PROCEDURE — 87880 STREP A ASSAY W/OPTIC: CPT | Mod: QW

## 2018-08-16 RX ORDER — MOMETASONE 50 UG/1
50 SPRAY, METERED NASAL DAILY
Qty: 1 | Refills: 0 | Status: ACTIVE | COMMUNITY
Start: 2018-08-16 | End: 1900-01-01

## 2018-08-16 RX ORDER — AMOXICILLIN AND CLAVULANATE POTASSIUM 875; 125 MG/1; MG/1
875-125 TABLET, COATED ORAL TWICE DAILY
Qty: 14 | Refills: 0 | Status: ACTIVE | COMMUNITY
Start: 2018-08-16 | End: 1900-01-01

## 2018-08-17 NOTE — PHYSICAL EXAM
[No Acute Distress] : no acute distress [Well Nourished] : well nourished [Well Developed] : well developed [Normal Sclera/Conjunctiva] : normal sclera/conjunctiva [EOMI] : extraocular movements intact [Normal Outer Ear/Nose] : the outer ears and nose were normal in appearance [Normal TMs] : both tympanic membranes were normal [No JVD] : no jugular venous distention [Supple] : supple [No Lymphadenopathy] : no lymphadenopathy [Thyroid Normal, No Nodules] : the thyroid was normal and there were no nodules present [No Respiratory Distress] : no respiratory distress  [Clear to Auscultation] : lungs were clear to auscultation bilaterally [No Accessory Muscle Use] : no accessory muscle use [Normal Rate] : normal rate  [Regular Rhythm] : with a regular rhythm [Normal S1, S2] : normal S1 and S2 [No Murmur] : no murmur heard [No Carotid Bruits] : no carotid bruits [Pedal Pulses Present] : the pedal pulses are present [No Edema] : there was no peripheral edema [Soft] : abdomen soft [Non Tender] : non-tender [Non-distended] : non-distended [No Masses] : no abdominal mass palpated [No HSM] : no HSM [Normal Bowel Sounds] : normal bowel sounds [Normal Posterior Cervical Nodes] : no posterior cervical lymphadenopathy [Normal Anterior Cervical Nodes] : no anterior cervical lymphadenopathy [No CVA Tenderness] : no CVA  tenderness [No Spinal Tenderness] : no spinal tenderness [No Joint Swelling] : no joint swelling [Grossly Normal Strength/Tone] : grossly normal strength/tone [No Rash] : no rash [Normal Gait] : normal gait [No Focal Deficits] : no focal deficits [Normal Affect] : the affect was normal [Normal Insight/Judgement] : insight and judgment were intact [de-identified] : + throat erythema / + tenderness opver the maxillary sinus area

## 2018-08-17 NOTE — HISTORY OF PRESENT ILLNESS
[de-identified] : 47 year old male presents with acute URI x 3 days : + sore throat associated with pain with swallowing, runny nose, + sinus congestion/ pressure, denies cough. Symptoms getting worse at night. He takes OTC decongestant without much improvement. She denies fever, chills, SOB, N, V, abdominal pain \par

## 2018-08-20 LAB — S PYO AG SPEC QL IA: NEGATIVE

## 2018-09-07 ENCOUNTER — RX RENEWAL (OUTPATIENT)
Age: 48
End: 2018-09-07

## 2018-12-07 ENCOUNTER — EMERGENCY (EMERGENCY)
Facility: HOSPITAL | Age: 48
LOS: 1 days | Discharge: HOME | End: 2018-12-07
Attending: EMERGENCY MEDICINE
Payer: SELF-PAY

## 2018-12-07 VITALS
HEART RATE: 88 BPM | RESPIRATION RATE: 16 BRPM | DIASTOLIC BLOOD PRESSURE: 97 MMHG | WEIGHT: 225.09 LBS | HEIGHT: 71 IN | OXYGEN SATURATION: 100 % | TEMPERATURE: 98 F | SYSTOLIC BLOOD PRESSURE: 154 MMHG

## 2018-12-07 DIAGNOSIS — Z98.89 OTHER SPECIFIED POSTPROCEDURAL STATES: Chronic | ICD-10-CM

## 2018-12-07 DIAGNOSIS — K08.499 PARTIAL LOSS OF TEETH DUE TO OTHER SPECIFIED CAUSE, UNSPECIFIED CLASS: Chronic | ICD-10-CM

## 2018-12-07 PROCEDURE — 99053 MED SERV 10PM-8AM 24 HR FAC: CPT

## 2018-12-07 PROCEDURE — 99283 EMERGENCY DEPT VISIT LOW MDM: CPT | Mod: 25

## 2018-12-08 VITALS
HEART RATE: 62 BPM | SYSTOLIC BLOOD PRESSURE: 137 MMHG | RESPIRATION RATE: 16 BRPM | DIASTOLIC BLOOD PRESSURE: 89 MMHG | OXYGEN SATURATION: 96 %

## 2018-12-08 DIAGNOSIS — Z98.890 OTHER SPECIFIED POSTPROCEDURAL STATES: Chronic | ICD-10-CM

## 2018-12-08 PROCEDURE — 99283 EMERGENCY DEPT VISIT LOW MDM: CPT

## 2018-12-08 RX ORDER — AZTREONAM 2 G
1 VIAL (EA) INJECTION
Qty: 20 | Refills: 0 | OUTPATIENT
Start: 2018-12-08 | End: 2018-12-17

## 2018-12-08 RX ADMIN — Medication 1 TABLET(S): at 02:40

## 2018-12-08 NOTE — ED PROVIDER NOTE - NS ED ROS FT
REVIEW OF SYSTEMS:    CONSTITUTIONAL: +chills, subjective fevers  EYES/ENT: No visual changes;  No vertigo or throat pain   NECK: No pain or stiffness  RESPIRATORY: No cough, wheezing, hemoptysis; No shortness of breath  CARDIOVASCULAR: No chest pain or palpitations  GASTROINTESTINAL: No abdominal or epigastric pain. No nausea, vomiting, or hematemesis; No diarrhea or constipation. No melena or hematochezia.  GENITOURINARY: No dysuria, frequency or hematuria  NEUROLOGICAL: No numbness or weakness  SKIN: redness and pain on superficial nose and skin laterally

## 2018-12-08 NOTE — ED PROVIDER NOTE - PLAN OF CARE
Cellulitis on nare very dangerous for spread to CNS so will defer further care to ENT.  Will give bactrim double strength BID for 7-10 days

## 2018-12-08 NOTE — ED PROVIDER NOTE - MEDICAL DECISION MAKING DETAILS
Cellulitis of inner nare.  Will defer possible drainage to ENT given location and possible spread to CNS.  Will give bactrim double strength BID for 7 to 10 days for antibiotic treatment. Cellulitis of inner nare.  Will defer possible drainage to ENT given location and possible spread to CNS.  Will give bactrim double strength BID for10 days for antibiotic treatment.

## 2018-12-08 NOTE — ED PROVIDER NOTE - NSFOLLOWUPINSTRUCTIONS_ED_ALL_ED_FT
Please take the antibiotic for the next 10 days.  Please also make an appointment as soon as possible with an ENT doctor for possible drainage of the site.

## 2018-12-08 NOTE — ED PROVIDER NOTE - ATTENDING CONTRIBUTION TO CARE
49 y/o male with the above documented history and HPI who on exam appears well and comfortable. VSs noted, nose c/ small swelling, erythema, and tenderness at proximal and medial aspect of R nostril, neck supple, breathing c/ ease, extremities s/ asymmetry, and neurologically intact. There is nothing clinically evident to suggest any emergent process. Said he had been trying to pick/pop it himself. No obvious fluctuance. Instructed to stop doing so. Provided RXN for ABX with appropriate instruction and follow-up.

## 2018-12-08 NOTE — ED PROVIDER NOTE - PHYSICAL EXAMINATION
PHYSICAL EXAM:  GENERAL: NAD, well-developed  HEENT:  1cm raised pustule on medial right nostril, erythema on superficial nose extending 2 cm laterally and into upper lip  EYES: EOMI, PERRLA, conjunctiva and sclera clear  NECK: Supple, No JVD  CHEST/LUNG: Clear to auscultation bilaterally; No wheeze  HEART: Regular rate and rhythm; No murmurs, rubs, or gallops  ABDOMEN: Soft, Nontender, Nondistended; Bowel sounds present  EXTREMITIES:  2+ Peripheral Pulses, No clubbing, cyanosis, or edema  PSYCH: AAOx3  NEUROLOGY: non-focal  SKIN: No rashes or lesions

## 2018-12-08 NOTE — ED PROVIDER NOTE - CARE PLAN
Principal Discharge DX:	Abscess of face  Assessment and plan of treatment:	Cellulitis on nare very dangerous for spread to CNS so will defer further care to ENT.  Will give bactrim double strength BID for 7-10 days

## 2018-12-08 NOTE — ED PROVIDER NOTE - NSFOLLOWUPCLINICS_GEN_ALL_ED_FT
[facility] - ENT  Otolaryngology (ENT)  [address] [place] [address]  Phone: [phone]  Fax:   Follow Up Time:

## 2018-12-08 NOTE — ED PROVIDER NOTE - OBJECTIVE STATEMENT
47yo with hx multiple MRSA infections presents with nasal rash.  He originally obtained MRSA from a facial surgery for an infected tooth that required a great amount of facial debridement.  Since the surgery he has had three MRSA infections in his face and one in his knee.  The last MRSA infection was about six months ago.  Over the last two weeks he has not had heat in his apartment and developed cold like symptoms.  From constantly touching his nose, he thinks he developed another infection in his nose.  He notes two small pustules in his right nostril both of which he was able express pus from.  He also notes that redness and pain from his nose is spreading laterally and downwards.

## 2018-12-08 NOTE — ED ADULT NURSE NOTE - OBJECTIVE STATEMENT
48 y.o M presents to the ED from home c/o nose pain. Patient is awake, alert and speaking coherently. As per patient, he has had nose pain and swelling for 4 days; states "I had pimples and scabs in my nose and when I picked the scab blood and pus came out." Patient reports he has a pmhx of MRSA in the nose. Patient states his pain is 6/10 and is not progressing to the lip.

## 2018-12-08 NOTE — ED ADULT NURSE NOTE - NSIMPLEMENTINTERV_GEN_ALL_ED
Implemented All Universal Safety Interventions:  [place] to call system. Call bell, personal items and telephone within reach. Instruct patient to call for assistance. Room bathroom lighting operational. Non-slip footwear when patient is off stretcher. Physically safe environment: no spills, clutter or unnecessary equipment. Stretcher in lowest position, wheels locked, appropriate side rails in place.

## 2018-12-12 ENCOUNTER — RX RENEWAL (OUTPATIENT)
Age: 48
End: 2018-12-12

## 2018-12-12 RX ORDER — TIZANIDINE 2 MG/1
2 TABLET ORAL
Qty: 60 | Refills: 0 | Status: ACTIVE | COMMUNITY
Start: 2018-01-12 | End: 1900-01-01

## 2019-08-24 NOTE — ED PROVIDER NOTE - CHIEF COMPLAINT
The patient is a 46y Male complaining of see chief complaint quote. [facility] - Urology  Urology  [address], 3rd & 4th [place] [address]  Phone: [phone]  Fax:   Follow Up Time: 1-3 Days

## 2019-10-07 NOTE — ED PROVIDER NOTE - CPE EDP SKIN NORM
Detail Level: Simple
Comment: Discussed LN2. Pt deferred.
Comment: Not clinically evident. Discussed injections. Possible diagnosis per pt history.
WOUNDS

## 2020-07-01 NOTE — ED ADULT TRIAGE NOTE - SPO2 (%)
Clinic Care Coordination Contact  Community Health Worker Initial Outreach            Patient accepts CC: No, Patient says he has all resources needed at this time. . Patient will be sent Care Coordination introduction letter for future reference.     The Clinic Community Health Worker spoke with the patient today at the request of the PCP to discuss possible Clinic Care Coordination enrollment. The service was described to the patient and immediate needs were discussed. The patient declined enrollment at this time. The PCP is encouraged to refer in the future if the patient's needs change.      Patricia SIEGEL Community Health Worker  Clinic Care Coordination  [facility] : [address]  Phone: [phone]        Reason for Referral: Care Transition: Inpatient to outpatient  
97

## 2020-12-16 PROBLEM — Z87.09 HISTORY OF ACUTE PHARYNGITIS: Status: RESOLVED | Noted: 2018-08-16 | Resolved: 2020-12-16

## 2020-12-16 PROBLEM — Z87.09 HISTORY OF ACUTE SINUSITIS: Status: RESOLVED | Noted: 2018-08-16 | Resolved: 2020-12-16

## 2023-05-15 NOTE — ED ADULT TRIAGE NOTE - AS O2 DELIVERY
Initial SW/CM Assessment/Plan of Care Note     Baseline Assessment  78 year old admitted 5/13/2023 as Observation with a diagnosis of generalized weakness.   Prior to admission patient was living with Spouse and residing at Senior apartment    Patient does  have a Power of  for Healthcare.  Document is not activated.  Agent is Lex Roy. . Patient’s Primary Care Provider is Eladio Moreno MD.     Progress Note  SW is consulted for dc planning. SW met with pt 1:1 at bedside. Pt was most recently admitted on 5/8 for a UTI and discharged home on 5/11. Pt reports after discharge, \"I got very ill on all the meds...there were too many medications.\"Pt presents as alert, oriented x 4. She vacillates between being cooperative and pleasant to uncooperative and abrupt.    Pt reports she has lived in a senior apartment for the last 19 years. She lives with her  of 39 years. She does not have any pets. Her bathroom is wheelchair accessible and she has grab bars. She and her  both take sponge baths d/t being unable to step over the tub to get in her shower. She is independent with dressing, but uses a dressing stick to assist with shoes/socks. She has a wheelchair and a front-wheeled walker at home. She has transportation through Medicaid and arranges all of her appointments. She has a landline for a home phone. She does not use a cell phone and does not have Internet. She has a family friend who does her shopping. She also reports receiving home care services through [facility], but could not state what services they provide or how frequently. Her friend picks up her medications from the pharmacy. She administers her own medications and reports managing her 's medications as well. She denies having any DME/devices; she reports she previously had an order for a hospital bed, but was unable to arrange for someone to haul away her old bed.     She is retired from working in personnel/office  management. She has 4 children. She denies any family members live nearby; her daughter lives the closest and she is in [place].  Pt reports she has friends in her apartment complex. She enjoys cooking.     Pt has a previous [facility] admission to [facility]. She flatly refuses recommendation for SALVADOR at this time. She is insistent she is able to care for herself, mentions being offended that people have implied that she cannot. Regarding SALVADOR, she states, \"You can't trust those places.\"    Pt's goal is to return home with home therapy - including PT/OT - with [facility] (fax [phone]).     Anticipate pt will be able to use Medicaid transportation at discharge, though she has used EMS in the past.     Plan  Patient/Family Discharge Goal: Home   Is patient/family goal achievable: Yes    SW/CM - Recommendations for Discharge:  (Deferred to medical providers)   PT - Recommendations for Discharge: Post-acute facility with therapy needs    OT - Recommendations for Discharge: Post-acute facility with therapy needs    SLP - Recommendations for Discharge:      Barriers to Discharge  Identified Barriers to Discharge/Transition Planning:          Anticipate patient will need post-hospital services. Necessary services are available.  Anticipate patient can return to the environment from which patient entered the hospital.   Anticipate patient can provide self-care at discharge.    Refer to SW/CM Flowsheet for objective data.     Medical History  Past Medical History:   Diagnosis Date   • Abdominal discomfort 5/24/2019   • Anemia, normocytic normochromic      borderline anemia hx; MCV/H WNL but low normal.  b12/folate WNL 6/13/12   • Anxiety    • Asthma    • Cardiac arrhythmia    • Chronic cystitis    • Chronic pain     Due to rheumatoid arthritis   • Clostridium difficile diarrhea 9/27/2018   • Coronary artery disease    • Delirium 1/31/2020   • Follow-up exam    • GERD without esophagitis     EGD 12/29/15 with Bx by   [facility] no cancers, see report   • History of colonic diverticulitis     9/2018   • History of colonoscopy     2010.  Hx diverticula, and diverticulitis episode 9/2018   • History of mammogram     L mammogram diagnostic 7/22/11 \"probably benign, rec 6 mo f/u\" in old records, no more seen   • Hyperlipidemia, unspecified    • Hypertension, essential    • Hypothyroidism (acquired)    • Long term (current) use of anticoagulants    • Myocardial infarction (CMD)    • Need for immunization against influenza     fluvax 12/9/02   • Need for pneumococcal vaccine    • Need for Td vaccine    • Need for zoster vaccine    • Nephrolithiasis    • Obesity (BMI 30-39.9)    • Osteoarthritis of multiple joints     follows Rheumatology for RA and OA   • Paroxysmal atrial fibrillation (CMD)    • Pneumonia    • RA (rheumatoid arthritis) (CMD)     seropositive RA; and OA, follows Rheumatology Dr Becerra including 2019   • Recurrent UTI 12/16/2015   • Screening for diabetic retinopathy    • Screening for nephropathy    • Seropositive rheumatoid arthritis of multiple sites (CMD)     Dx code M05.9 +RF, -CCP, chronic RA deformities, Dx age 40s  T/F: NSAIDs, Plaquenil, MTX, leflunomide, sulfasalazine, anakinra, Prednisone.  MI in January, avoid TNFs with heart condition. Avoid prednisone with poorly controlled diabetes.  Avoid other small molecule/biologic therapies with recurrent UTIs  Dr. Lundberg = First rheumatologist, review of records: Dr. Rubén Garcia ([phone]), Dr. Chao   • ST elevation myocardial infarction (STEMI) (CMD) 1/31/2020   • Type 2 diabetes mellitus with hyperglycemia (CMD)    • Urinary incontinence    • Vitamin D deficiency     Vit D=14.7 9/27/18       Prior to Admission Status  Functional Status  Ambulation: Walker, Wheelchair  Bathing: Sponge Bath  Dressing: Independent/Self  Toileting: Grab bars  Meal Preparation: Independent/Self  Shopping: Other (comment) (Friend)  Medication Preparation: Independent/Self  Medication  Administration: Independent/Self  Housekeeping: Homemaker  Transportation: Wheelchair Van    Agency/Support  Type of Services Prior to Hospitalization: Home care, OT, PT, Nurse (specify)  Support Systems: Family members   Home Devices/Equipment: Blood pressure monitor, Elevated toilet seat, Mobility assist device, Blood glucose monitor  Mobility Assist Devices: Wheelchair, Front-wheeled walker  Sensory Support Devices: None      Current Status  PT Ambulation Tips:    PT Transfer Tips:     OT Bathing Tips:    OT Dressing Tips:    OT Toileting Tips: Toilets with total assist  OT Feeding Tips:    SLP Swallow/Feeding Tips:    SLP Comm/Cog Tips:    Current Mental Status: Alert, Oriented to Person, Oriented to Place, Oriented to Reason for Hospitalization, Oriented to Time  Stressors:      Insurance  Primary: [facility] MEDICARE ADVANTAGE  Secondary: [place] MEDICAID    Disposition Recommendations:  SW/CM recommendation for discharge:  (Deferred to medical providers)          room air

## 2023-06-01 NOTE — ED ADULT NURSE NOTE - DISCHARGE DATE/TIME
Include Z78.9 (Other Specified Conditions Influencing Health Status) As An Associated Diagnosis?: No 20-Jun-2017 00:41